# Patient Record
(demographics unavailable — no encounter records)

---

## 2024-10-09 NOTE — HISTORY OF PRESENT ILLNESS
[de-identified] : 10/09/2024 - Patient presenting in follow up. He reports he is feeling better compared to his prior visit regarding his axial neck stiffness. He is completing PT twice a week, continues to make progress and would like to continue. He is also complaining of left shoulder pain and difficulty with LT shoulder rom, feels PT would also be helpful for this.   09/18/2024 - Patient returns to the office to review MRI cervical spine. He continues to offer complaints of neck pain with reduced range of motion and symptoms radiating into the upper extremities. No changes in bowel or bladder function. No changes in balance. Primary care physician is working up possible tick-borne illness. Recently completed Z Pac Antibiotics  09/06/2024 - 81 Y M presenting for evaluation with chief complaint of neck pain for 2 weeks. He states he hit head on top of car roof, otherwise denies trauma or injury. He complains of minor ache in the upper back and neck, as well as limited cervical rom. Pain is worst with cervical rotation. He worse a soft collar to bed and did not find it helpful. Tylenol is helpful for pain control. Had sore throat for about one week. Went to PCP and ruled out covid, meningitis, strep throat.  He is on day 2 of Z-pack.   The patient is a 81 year male who presents today complaining of neck pain Date of Injury/Onset: 2 weeks Pain:    At Rest: 2/10 With Activity:  2-8/10  Mechanism of injury: NKI Quality of symptoms: tightness, stiffness, dull Improves with: Tylenol Worse with: turning Prior treatment: no Prior Imaging: no Additional Information: None

## 2024-10-09 NOTE — DATA REVIEWED
[I independently reviewed and interpreted images and report] : I independently reviewed and interpreted images and report [FreeTextEntry1] : On my interpretation of these images and reports Cervical MRI 9/6/24 [facility] Multilevel cervical spondylosis. No evidence of fracture or infiltrative process. Small central herniation 3/4 with left sided foraminal stenosis. Degeneration and disc space collapse at C4/5 with L>R foraminal stenosis. HNP C5/6. Modic changes 7/T1  I stop paperwork reviewed Ortho progress notes reviewed

## 2024-10-09 NOTE — DISCUSSION/SUMMARY
[Medication Risks Reviewed] : Medication risks reviewed [de-identified] : 82 Y M with cervical disc herniation  Recommend continued course of outpatient physical therapy at his cervical spine, upper extremity and shoulder, focusing on range of motion, modalities and proper body positioning. Patient is unable to tolerate an inflammatory medication. He's already on chronic Valium and may not be appropriate candidate for muscle relaxers in addition secondary to sedation. Patient has discussed with cardiology possibly holding the eliquis for spinal steroid injections or trigger point injections if need be if he becomes refractory to physical therapy.   Prior to appointment and during encounter with patient extensive medical records were reviewed including but not limited to, hospital records, outpatient records, imaging results, and lab data. During this appointment the patient was examined, diagnoses were discussed and explained in a face to face manner. In addition extensive time was spent reviewing aforementioned diagnostic studies. Counseling including abnormal image results, differential diagnoses, treatment options, risk and benefits, lifestyle changes, current condition, and current medications was performed. Patient's comments, questions, and concerns were addressed and patient verbalized understanding. Based on this patient's presentation at our office, which is an orthopedic spine surgeon's office, this patient inherently / intrinsically has a risk, however minute, of developing issues such as Cauda equina syndrome, bowel and bladder changes, or progression of motor or neurological deficits such as paralysis which may be permanent.   DEISI ABERNATHY Acting as a Scribe for Dr. Nick KNAPP, Deisi Abernathy, attest that this documentation has been prepared under the direction and in the presence of Provider Nasir Romero MD.

## 2024-10-09 NOTE — PHYSICAL EXAM
[Normal Coordination] : normal coordination [Normal DTR UE/LE] : normal DTR UE/LE  [Normal Sensation] : normal sensation [Normal Mood and Affect] : normal mood and affect [Oriented] : oriented [Able to Communicate] : able to communicate [Normal Skin] : normal skin [No Rash] : no rash [No Ulcers] : no ulcers [No Lesions] : no lesions [No obvious lymphadenopathy in areas examined] : no obvious lymphadenopathy in areas examined [Well Developed] : well developed [Peripheral vascular exam is grossly normal] : peripheral vascular exam is grossly normal [No Respiratory Distress] : no respiratory distress [Flexion] : flexion [Extension] : extension [Rotation to left] : rotation to left [Rotation to right] : rotation to right [Biceps 2+] : biceps 2+ [Triceps 2+] : triceps 2+ [Brachioradialis 2+] : brachioradialis 2+ [Left] : left shoulder [] : full range of motion with pain in all planes of motion [Negative] : Endocrine

## 2024-10-09 NOTE — HISTORY OF PRESENT ILLNESS
[de-identified] : 10/09/2024 - Patient presenting in follow up. He reports he is feeling better compared to his prior visit regarding his axial neck stiffness. He is completing PT twice a week, continues to make progress and would like to continue. He is also complaining of left shoulder pain and difficulty with LT shoulder rom, feels PT would also be helpful for this.   09/18/2024 - Patient returns to the office to review MRI cervical spine. He continues to offer complaints of neck pain with reduced range of motion and symptoms radiating into the upper extremities. No changes in bowel or bladder function. No changes in balance. Primary care physician is working up possible tick-borne illness. Recently completed Z Pac Antibiotics  09/06/2024 - 81 Y M presenting for evaluation with chief complaint of neck pain for 2 weeks. He states he hit head on top of car roof, otherwise denies trauma or injury. He complains of minor ache in the upper back and neck, as well as limited cervical rom. Pain is worst with cervical rotation. He worse a soft collar to bed and did not find it helpful. Tylenol is helpful for pain control. Had sore throat for about one week. Went to PCP and ruled out covid, meningitis, strep throat.  He is on day 2 of Z-pack.   The patient is a 81 year male who presents today complaining of neck pain Date of Injury/Onset: 2 weeks Pain:    At Rest: 2/10 With Activity:  2-8/10  Mechanism of injury: NKI Quality of symptoms: tightness, stiffness, dull Improves with: Tylenol Worse with: turning Prior treatment: no Prior Imaging: no Additional Information: None

## 2024-10-09 NOTE — DISCUSSION/SUMMARY
[Medication Risks Reviewed] : Medication risks reviewed [de-identified] : 82 Y M with cervical disc herniation  Recommend continued course of outpatient physical therapy at his cervical spine, upper extremity and shoulder, focusing on range of motion, modalities and proper body positioning. Patient is unable to tolerate an inflammatory medication. He's already on chronic Valium and may not be appropriate candidate for muscle relaxers in addition secondary to sedation. Patient has discussed with cardiology possibly holding the eliquis for spinal steroid injections or trigger point injections if need be if he becomes refractory to physical therapy.   Prior to appointment and during encounter with patient extensive medical records were reviewed including but not limited to, hospital records, outpatient records, imaging results, and lab data. During this appointment the patient was examined, diagnoses were discussed and explained in a face to face manner. In addition extensive time was spent reviewing aforementioned diagnostic studies. Counseling including abnormal image results, differential diagnoses, treatment options, risk and benefits, lifestyle changes, current condition, and current medications was performed. Patient's comments, questions, and concerns were addressed and patient verbalized understanding. Based on this patient's presentation at our office, which is an orthopedic spine surgeon's office, this patient inherently / intrinsically has a risk, however minute, of developing issues such as Cauda equina syndrome, bowel and bladder changes, or progression of motor or neurological deficits such as paralysis which may be permanent.   DEISI ABERNATHY Acting as a Scribe for Dr. Nick KNAPP, Deisi Abernathy, attest that this documentation has been prepared under the direction and in the presence of Provider Nasir Romero MD.

## 2024-10-09 NOTE — PHYSICAL EXAM
[Normal Coordination] : normal coordination [Normal DTR UE/LE] : normal DTR UE/LE  [Normal Sensation] : normal sensation [Normal Mood and Affect] : normal mood and affect [Oriented] : oriented [Able to Communicate] : able to communicate [Normal Skin] : normal skin [No Rash] : no rash [No Ulcers] : no ulcers [No Lesions] : no lesions [No obvious lymphadenopathy in areas examined] : no obvious lymphadenopathy in areas examined [Well Developed] : well developed [Peripheral vascular exam is grossly normal] : peripheral vascular exam is grossly normal [No Respiratory Distress] : no respiratory distress [Flexion] : flexion [Extension] : extension [Rotation to left] : rotation to left [Rotation to right] : rotation to right [Biceps 2+] : biceps 2+ [Triceps 2+] : triceps 2+ [Brachioradialis 2+] : brachioradialis 2+ [Left] : left shoulder [] : full range of motion with pain in all planes of motion

## 2024-11-14 NOTE — PHYSICAL EXAM
[Left] : left shoulder [de-identified] : Constitutional: - No acute distress - Well developed; well nourished   Neurological: - normal mood and affect - alert and oriented x 3   Cardiovascular: - grossly normal  Cervical Spine Exam:   Inspection: erythema (-) ecchymosis (-) rashes (-) scoliosis   Palpation:                                               Cervical paraspinal tenderness:         R (-); L (-) Upper trapezius tenderness:              R (-); L (-) Rhomboids tenderness:                      R (-); L (-) Occipital Ridge:                                   R (-); L (-) Supraspinatus tenderness:                 R (-); L (-)   ROM: WNL Crepitus throughout ROM testing No pain throughout ROM testing   Strength Testing:            Right    Left Deltoid                             (5/5)    (5/5) Biceps:                            (5/5)    (5/5) Triceps:                           (5/5)    (5/5) Finger Abductors:           (5/5)    (5/5) Grasp:                             (5/5)    (5/5)   Special Testing: Spurling Test:                  R (-); L (-) Facet load test:               R (-); L (-) Narayan test:                  R (-); L (-)   Neuro: SILT throughout right upper extremity SI LT throughout left upper extremity   Reflexes: Biceps   -           R (2+); L (2+) Triceps  -           R (2+); L (2+) Brachioradialis- R (2+); L (2+)   No ankle clonus [Sitting] : sitting [Moderate] : moderate [] : positive Melina

## 2024-11-14 NOTE — REASON FOR VISIT
[Initial Consultation] : an initial pain management consultation [FreeTextEntry2] : left shoulder pain

## 2024-11-14 NOTE — HISTORY OF PRESENT ILLNESS
[Neck] : neck [5] : 5 [0] : 0 [Dull/Aching] : dull/aching [Stabbing] : stabbing [Intermittent] : intermittent [Household chores] : household chores [Leisure] : leisure [Physical therapy] : physical therapy [FreeTextEntry1] : 11/14/2024 - The patient presents for initial evaluation regarding his left shoulder pain.  Patient was referred by Dr. Romero. Patient reports he is experiencing focal left shoulder pain with radiation down his left arm above his elbow.   He denies any neck pain or pain rating below the left elbow.  His pain began in August 2024 with no specific inciting event.  He previously had neck pain but participated in PT per Dr. Romero and it has since resolved. He is currently continuing PT for his left shoulder. Patient is also currently following up with a rheumatologist.  He has seen Dr. Gallegos in the past for right shoulder pain mid-summer.  He underwent a right subacromial CSI which completely resolved his pain.   Subjective Weakness: No Numbness/Tingling: No Bladder/Bowel dysfunction: No Gait Abnormalities: No Fine motor coordination changes: No   Injections:  1) right subacromial CSI (7/10/2024)   Pertinent Surgical History: N/A   Imaging: MRI Cervical Spine (9/6/24) - [facility]   Physician Disclaimer: I have personally reviewed and confirmed all HPI data with the patient. [] : Post Surgical Visit: no [FreeTextEntry6] : Pinching [FreeTextEntry7] : Left shoulder [de-identified] : Any arm movements  [de-identified] : C MRI AT [facility]

## 2024-11-14 NOTE — HISTORY OF PRESENT ILLNESS
[Neck] : neck [5] : 5 [0] : 0 [Dull/Aching] : dull/aching [Stabbing] : stabbing [Intermittent] : intermittent [Household chores] : household chores [Leisure] : leisure [Physical therapy] : physical therapy [FreeTextEntry1] : 11/14/2024 - The patient presents for initial evaluation regarding his left shoulder pain.  Patient was referred by Dr. Romero. Patient reports he is experiencing focal left shoulder pain with radiation down his left arm above his elbow.   He denies any neck pain or pain rating below the left elbow.  His pain began in August 2024 with no specific inciting event.  He previously had neck pain but participated in PT per Dr. Romero and it has since resolved. He is currently continuing PT for his left shoulder. Patient is also currently following up with a rheumatologist.  He has seen Dr. Gallegos in the past for right shoulder pain mid-summer.  He underwent a right subacromial CSI which completely resolved his pain.   Subjective Weakness: No Numbness/Tingling: No Bladder/Bowel dysfunction: No Gait Abnormalities: No Fine motor coordination changes: No   Injections:  1) right subacromial CSI (7/10/2024)   Pertinent Surgical History: N/A   Imaging: MRI Cervical Spine (9/6/24) - [facility]   Physician Disclaimer: I have personally reviewed and confirmed all HPI data with the patient. [] : Post Surgical Visit: no [FreeTextEntry6] : Pinching [FreeTextEntry7] : Left shoulder [de-identified] : Any arm movements  [de-identified] : C MRI AT [facility]

## 2024-11-14 NOTE — PHYSICAL EXAM
[Left] : left shoulder [de-identified] : Constitutional: - No acute distress - Well developed; well nourished   Neurological: - normal mood and affect - alert and oriented x 3   Cardiovascular: - grossly normal  Cervical Spine Exam:   Inspection: erythema (-) ecchymosis (-) rashes (-) scoliosis   Palpation:                                               Cervical paraspinal tenderness:         R (-); L (-) Upper trapezius tenderness:              R (-); L (-) Rhomboids tenderness:                      R (-); L (-) Occipital Ridge:                                   R (-); L (-) Supraspinatus tenderness:                 R (-); L (-)   ROM: WNL Crepitus throughout ROM testing No pain throughout ROM testing   Strength Testing:            Right    Left Deltoid                             (5/5)    (5/5) Biceps:                            (5/5)    (5/5) Triceps:                           (5/5)    (5/5) Finger Abductors:           (5/5)    (5/5) Grasp:                             (5/5)    (5/5)   Special Testing: Spurling Test:                  R (-); L (-) Facet load test:               R (-); L (-) Narayan test:                  R (-); L (-)   Neuro: SILT throughout right upper extremity SI LT throughout left upper extremity   Reflexes: Biceps   -           R (2+); L (2+) Triceps  -           R (2+); L (2+) Brachioradialis- R (2+); L (2+)   No ankle clonus [Sitting] : sitting [Moderate] : moderate [] : positive Melina

## 2024-11-14 NOTE — ASSESSMENT
[FreeTextEntry1] : We discussed the nature of the underlying pathology and available pain management treatment options. These included interventional, rehabilitative, pharmacological, and complementary modalities. We will proceed with the following:    Interventional treatment options: - None indicated at present time with regard to cervical spine - Defer interventional treatment for the left shoulder to orthopedics - see additional instructions below    Rehabilitative options: - Continue physical therapy - Participation in active HEP was discussed and encouraged as tolerated   Medication based treatment options: - Not candidate for oral NSAIDs secondary to chronic anticoagulation - Continue Tylenol 500-1000 mg up to TID as needed - Corticosteroid trial as per rheumatology - See additional instructions below    Complementary treatment options: - lifestyle modifications discussed   Additional treatment recommendations as follows: - Patient will follow-up with orthopedics for ongoing left shoulder pain - patient will follow up on as-needed basis  I, Florinda Claire, acting as scribe, attest that this documentation has been prepared under the direction and in the presence of Provider Pino Shi DO.  The documentation recorded by the scribe, in my presence, accurately reflects the service I personally performed, and the decisions made by me with my edits as appropriate.

## 2024-11-22 NOTE — HISTORY OF PRESENT ILLNESS
[de-identified] : This is a 81yo male presenting to the office c/o ongoing left shoulder pain x a couple months. Pt woke up on 8/28 with a sore throat and a fever and couldn't move his shoulder. Patient went to  and ruled Covid and Meningitis out. Pt then saw a Rheumatologist. Pain is described as constant, severe in quality. Currently pain is 7/10 and non-radiating. Patient reports pain has been getting progressively worse. Pain is worse at night. Overall pain does improve with rest and ice. Patient denies any numbness or tingling.

## 2024-11-22 NOTE — PHYSICAL EXAM
[Left] : left shoulder [There are no fractures, subluxations or dislocations. No significant abnormalities are seen] : There are no fractures, subluxations or dislocations. No significant abnormalities are seen [Type 2 acromion] : Type 2 acromion [de-identified] : Constitutional: The general appearance of the patient is well developed, well nourished, no deformities and well groomed. Normal   Gait: Gait and function is as follows: normal gait.   Skin: Head and neck visualized skin is normal. Left upper extremity visualized skin is normal. Right upper extremity visualized skin is normal. Thoracic Skin of the thoracic spine shows visualized skin is normal.   Cardiovascular: palpable radial pulse bilaterally, good capillary refill in digits of the bilateral upper extremities and no temperature or color changes in the bilateral upper extremities.   Lymphatic: Normal Palpation of lymph nodes in the cervical.   Neurologic: fine motor control in the bilateral upper extremities is intact. Deep Tendon Reflexes in Upper and Lower Extremities Negative Narayan's in the bilateral upper extremities. The patient is oriented to time, place and person. Sensation to light touch intact in the bilateral upper extremities. Mood and Affect is normal.   Right Shoulder: Inspection of the shoulder/upper arm is as follows: There is mild pain with range of motion of the shoulder   Left Shoulder: Inspection of the shoulder/upper arm is as follows: no scapula winging, no biceps deformity and no AC joint deformity. Palpation of the shoulder/upper arm is as follows: There is tenderness at the proximal biceps tendon. Range of motion of the shoulder is as follows: Pain with internal rotation, external rotation, abduction and forward flexion. Strength of the shoulder is as follows: Supraspinatus 4/5. External Rotation 4/5. Internal Rotation 4/5. Deltoid 5/5 Ligament Stability and Special Tests of the shoulder is as follows: Neer test is positive. Summers' test is positive. Speed's test is positive.   Neck:   Inspection / Palpation of the cervical spine is as follows: mild paracervical tenderness. Range of motion of the cervical spine is as follows: moderately decreased range of motion of the cervical spine. Stability testing for the cervical spine is as follows Stable range of motion.   Back, including spine: Inspection / Palpation of the thoracic/lumbar spine is as follows: There is a full, pain free, stable range of motion of the thoracic spine with a normal tone and not tenderness to palpation..

## 2024-11-22 NOTE — DISCUSSION/SUMMARY
[de-identified] : This is a 83yo male presenting to the office c/o ongoing left shoulder pain x a couple months.  I personally reviewed XR which is non diagnostic  Exam consistent with impingement/ bursitis  Pt was treated with a subacromial injection for diagnostic and therapeutic purposes  If no improvement will consider MRI in an effort to consider any type of surgical options Follow up 6 weeks    (1) We discussed a comprehensive treatment plans that included a prescription management plan involving the use of prescription strength medications to include Ibuprofen 600-800 mg TID, versus 500-650 mg Tylenol. We also discussed prescribing topical diclofenac (Voltaren gel) as well as once daily Meloxicam 15 mg. (2) The patient has More Than One chronic injuries/illnesses as outlined, discussed, and documented by ICD 10 codes listed, as well as the HPI and Plan section. There is a moderate risk of morbidity with further treatment, especially from use of prescription strength medications and possible side effects of these medications which include upset stomach and cardiac/renal issues with long term use were discussed. (3) I recommended that the patient follow-up with their medical physician to discuss any significant specific potential issues with long term use such as interactions with current medications or with exacerbation of underlying medical morbidities.   Attestation: I, Chloe MACKENZIE'Yady , attest that this documentation has been prepared under the direction and in the presence of Provider Kevin Gallegos MD. The documentation recorded by the scribe, in my presence, accurately reflects the service I personally performed, and the decisions made by me with my edits as appropriate. Kevin Gallegos MD

## 2024-11-22 NOTE — PROCEDURE
[FreeTextEntry3] : Large Joint Injection was performed because of pain and inflammation.   Anesthesia: ethyl chloride sprayed topically..   Kenalog: An injection of Kenalog 40 mg , 1 cc.   Lidocaine: 7 cc.   Medication was injected in the left subacromial space. Patient has tried OTC's including aspirin, Ibuprofen, Aleve etc or prescription NSAIDS, and/or exercises at home and/ or physical therapy without satisfactory response and Patient has decreased mobility in the joint. After verbal consent using sterile preparation and technique. The risks, benefits, and alternatives to cortisone injection were explained in full to the patient. Risks outlined include but are not limited to infection, sepsis, bleeding, scarring, skin discoloration, temporary increase in pain, syncopal episode, failure to resolve symptoms, allergic reaction, symptom recurrence, and elevation of blood sugar in diabetics. Patient understood the risks. All questions were answered. After discussion of options, patient requested an injection. Oral informed consent was obtained and sterile prep was done of the injection site. Sterile technique was utilized for the procedure including the preparation of the solutions used for the injection. Patient tolerated the procedure well. Advised to ice the injection site this evening. Prep with alcohol locally to site. Sterile technique used. Patient tolerated procedure well. Post Procedure Instructions: Patient was advised to call if redness, pain, or fever occur and apply ice for 15 min. out of every hour for the next 12-24 hours as tolerated. patient was advised to rest the joint(s) for 7 days.

## 2024-11-27 NOTE — DISCUSSION/SUMMARY
[de-identified] : This is a 81yo male presenting to the office c/o ongoing left shoulder pain x a couple months.  Initial x-rays were nondiagnostic. Patient was previously treated with subacromial injection which provided moderate improvement. He does continue to report lateral discomfort and weakness with functional activities.  He has worsening pain with movement away from his body. At this point recommending MRI to evaluate the status of his rotator cuff. Based on the patients history and physical exam findings, I am concerned about the possibility of a full-thickness rotator cuff tear.  The patient has pain and subjective weakness consistent with this diagnosis.  Therefore, I recommend an MRI to evaluate for a rotator cuff tear.  This will also aid in evaluating for injury to the biceps labral complex and for any signs of impingement. The patient will follow-up after MRI to discuss further treatment options. Patient will likely then begin a formal course of physical therapy.   Follow up 2-3 weeks   (1) We discussed a comprehensive treatment plans that included a prescription management plan involving the use of prescription strength medications to include Ibuprofen 600-800 mg TID, versus 500-650 mg Tylenol. We also discussed prescribing topical diclofenac (Voltaren gel) as well as once daily Meloxicam 15 mg. (2) The patient has More Than One chronic injuries/illnesses as outlined, discussed, and documented by ICD 10 codes listed, as well as the HPI and Plan section. There is a moderate risk of morbidity with further treatment, especially from use of prescription strength medications and possible side effects of these medications which include upset stomach and cardiac/renal issues with long term use were discussed. (3) I recommended that the patient follow-up with their medical physician to discuss any significant specific potential issues with long term use such as interactions with current medications or with exacerbation of underlying medical morbidities.   Attestation: I, Chloe Acevedo , attest that this documentation has been prepared under the direction and in the presence of Provider Kevin Gallegos MD. The documentation recorded by the scribe, in my presence, accurately reflects the service I personally performed, and the decisions made by me with my edits as appropriate. Kevin Gallegos MD

## 2024-11-27 NOTE — HISTORY OF PRESENT ILLNESS
[de-identified] : This is a 81yo male presenting to the office c/o ongoing left shoulder pain x a couple months. Pt woke up on 8/28 with a sore throat and a fever and couldn't move his shoulder. Patient went to  and ruled Covid and Meningitis out. Pt then saw a Rheumatologist. Pain is described as constant, severe in quality. Currently pain is 7/10 and non-radiating. Patient reports pain has been getting progressively worse. Pain is worse at night. Overall pain does improve with rest and ice. Patient denies any numbness or tingling. 11/27/24: Patient returns today for a follow-up of left shoulder pain.  Previous subacromial injection provided moderate relief.  He has noticed persistent lateral pain and difficulty with activity day living.  Patient continues to report functional weakness with motion extended away from his body.

## 2024-11-27 NOTE — PHYSICAL EXAM
[de-identified] : Constitutional: The general appearance of the patient is well developed, well nourished, no deformities and well groomed. Normal   Gait: Gait and function is as follows: normal gait.   Skin: Head and neck visualized skin is normal. Left upper extremity visualized skin is normal. Right upper extremity visualized skin is normal. Thoracic Skin of the thoracic spine shows visualized skin is normal.   Cardiovascular: palpable radial pulse bilaterally, good capillary refill in digits of the bilateral upper extremities and no temperature or color changes in the bilateral upper extremities.   Lymphatic: Normal Palpation of lymph nodes in the cervical.   Neurologic: fine motor control in the bilateral upper extremities is intact. Deep Tendon Reflexes in Upper and Lower Extremities Negative Narayan's in the bilateral upper extremities. The patient is oriented to time, place and person. Sensation to light touch intact in the bilateral upper extremities. Mood and Affect is normal.   Right Shoulder: Inspection of the shoulder/upper arm is as follows: There is mild pain with range of motion of the shoulder   Left Shoulder: Inspection of the shoulder/upper arm is as follows: no scapula winging, no biceps deformity and no AC joint deformity. Palpation of the shoulder/upper arm is as follows: There is tenderness at the proximal biceps tendon. Range of motion of the shoulder is as follows: Pain with internal rotation, external rotation, abduction and forward flexion. Strength of the shoulder is as follows: Supraspinatus 4/5. External Rotation 4/5. Internal Rotation 4/5. Deltoid 5/5 Ligament Stability and Special Tests of the shoulder is as follows: Neer test is positive. Summers' test is positive. Speed's test is positive.   Neck:   Inspection / Palpation of the cervical spine is as follows: mild paracervical tenderness. Range of motion of the cervical spine is as follows: moderately decreased range of motion of the cervical spine. Stability testing for the cervical spine is as follows Stable range of motion.   Back, including spine: Inspection / Palpation of the thoracic/lumbar spine is as follows: There is a full, pain free, stable range of motion of the thoracic spine with a normal tone and not tenderness to palpation..

## 2024-12-06 NOTE — DISCUSSION/SUMMARY
[de-identified] : This is a 83yo male presenting to the office c/o ongoing left shoulder pain x a couple months.  I personally reviewed Initial x-rays were nondiagnostic. Patient was previously treated with subacromial injection which provided moderate improvement. He does continue to report lateral discomfort and weakness with functional activities.  He has worsening pain with movement away from his body. I personally reviewed MRI which demonstrates biceps tenosynovitis and AC joint arthrosis  Follow up as needed   (1) We discussed a comprehensive treatment plans that included a prescription management plan involving the use of prescription strength medications to include Ibuprofen 600-800 mg TID, versus 500-650 mg Tylenol. We also discussed prescribing topical diclofenac (Voltaren gel) as well as once daily Meloxicam 15 mg. (2) The patient has More Than One chronic injuries/illnesses as outlined, discussed, and documented by ICD 10 codes listed, as well as the HPI and Plan section. There is a moderate risk of morbidity with further treatment, especially from use of prescription strength medications and possible side effects of these medications which include upset stomach and cardiac/renal issues with long term use were discussed. (3) I recommended that the patient follow-up with their medical physician to discuss any significant specific potential issues with long term use such as interactions with current medications or with exacerbation of underlying medical morbidities.   Attestation: I, Chloe Acevedo , attest that this documentation has been prepared under the direction and in the presence of Provider Kevin Gallegos MD. The documentation recorded by the scribe, in my presence, accurately reflects the service I personally performed, and the decisions made by me with my edits as appropriate. Kevin Gallegos MD

## 2024-12-06 NOTE — HISTORY OF PRESENT ILLNESS
[de-identified] : This is a 81yo male presenting to the office c/o ongoing left shoulder pain x a couple months. Pt woke up on 8/28 with a sore throat and a fever and couldn't move his shoulder. Patient went to  and ruled Covid and Meningitis out. Pt then saw a Rheumatologist. Pain is described as constant, severe in quality. Currently pain is 7/10 and non-radiating. Patient reports pain has been getting progressively worse. Pain is worse at night. Overall pain does improve with rest and ice. Patient denies any numbness or tingling. 11/27/24: Patient returns today for a follow-up of left shoulder pain.  Previous subacromial injection provided moderate relief.  He has noticed persistent lateral pain and difficulty with activity day living.  Patient continues to report functional weakness with motion extended away from his body.  12/6/24: Patient here for left shoulder pain. Pt here to review MRI results done at [facility] which demonstrates no significant rotator cuff tear. Pt reports

## 2024-12-06 NOTE — PHYSICAL EXAM
[de-identified] : Constitutional: The general appearance of the patient is well developed, well nourished, no deformities and well groomed. Normal   Gait: Gait and function is as follows: normal gait.   Skin: Head and neck visualized skin is normal. Left upper extremity visualized skin is normal. Right upper extremity visualized skin is normal. Thoracic Skin of the thoracic spine shows visualized skin is normal.   Cardiovascular: palpable radial pulse bilaterally, good capillary refill in digits of the bilateral upper extremities and no temperature or color changes in the bilateral upper extremities.   Lymphatic: Normal Palpation of lymph nodes in the cervical.   Neurologic: fine motor control in the bilateral upper extremities is intact. Deep Tendon Reflexes in Upper and Lower Extremities Negative Narayan's in the bilateral upper extremities. The patient is oriented to time, place and person. Sensation to light touch intact in the bilateral upper extremities. Mood and Affect is normal.   Right Shoulder: Inspection of the shoulder/upper arm is as follows: There is mild pain with range of motion of the shoulder   Left Shoulder: Inspection of the shoulder/upper arm is as follows: no scapula winging, no biceps deformity and no AC joint deformity. Palpation of the shoulder/upper arm is as follows: There is tenderness at the proximal biceps tendon. Range of motion of the shoulder is as follows: Pain with internal rotation, external rotation, abduction and forward flexion. Strength of the shoulder is as follows: Supraspinatus 4/5. External Rotation 4/5. Internal Rotation 4/5. Deltoid 5/5 Ligament Stability and Special Tests of the shoulder is as follows: Neer test is positive. Summers' test is positive. Speed's test is positive.   Neck:   Inspection / Palpation of the cervical spine is as follows: mild paracervical tenderness. Range of motion of the cervical spine is as follows: moderately decreased range of motion of the cervical spine. Stability testing for the cervical spine is as follows Stable range of motion.   Back, including spine: Inspection / Palpation of the thoracic/lumbar spine is as follows: There is a full, pain free, stable range of motion of the thoracic spine with a normal tone and not tenderness to palpation..